# Patient Record
Sex: MALE | Race: WHITE | Employment: OTHER | URBAN - METROPOLITAN AREA
[De-identification: names, ages, dates, MRNs, and addresses within clinical notes are randomized per-mention and may not be internally consistent; named-entity substitution may affect disease eponyms.]

---

## 2017-03-02 ENCOUNTER — TELEPHONE (OUTPATIENT)
Dept: OTHER | Facility: CLINIC | Age: 32
End: 2017-03-02

## 2017-03-02 NOTE — TELEPHONE ENCOUNTER
3/2/2017    Call Regarding Onboarding [facility] CHOICES    Attempt 1    Message on voicemail     Comments: NO DEP          Outreach   Ana Maria Zepeda

## 2017-05-25 NOTE — TELEPHONE ENCOUNTER
5/25/2017    Call Regarding Onboarding [facility]    Attempt 2    Message on voicemail     Comments: NO DEP        Outreach   NIKKI

## 2017-06-06 NOTE — TELEPHONE ENCOUNTER
6/6/2017    Call Regarding Onboarding [facility]    Attempt 3    Message on voicemail     Comments: NO DEP          Outreach   NIKKI

## 2018-02-21 ENCOUNTER — MEDICAL CORRESPONDENCE (OUTPATIENT)
Dept: HEALTH INFORMATION MANAGEMENT | Facility: CLINIC | Age: 33
End: 2018-02-21

## 2018-10-09 ENCOUNTER — TELEPHONE (OUTPATIENT)
Dept: DERMATOLOGY | Facility: CLINIC | Age: 33
End: 2018-10-09

## 2018-10-09 NOTE — TELEPHONE ENCOUNTER
LVM to schedule NAL with Frank or TISHA with Nam pt referred from [facility] for exercise induced rash with no response to Benadryl.

## 2018-10-18 ENCOUNTER — PRE VISIT (OUTPATIENT)
Dept: UROLOGY | Facility: CLINIC | Age: 33
End: 2018-10-18

## 2018-10-19 ENCOUNTER — OFFICE VISIT (OUTPATIENT)
Dept: DERMATOLOGY | Facility: CLINIC | Age: 33
End: 2018-10-19
Payer: COMMERCIAL

## 2018-10-19 DIAGNOSIS — L50.8 PHYSICAL URTICARIA: Primary | ICD-10-CM

## 2018-10-19 RX ORDER — LORATADINE 10 MG/1
10 TABLET ORAL DAILY
Qty: 90 TABLET | Refills: 3 | Status: SHIPPED | OUTPATIENT
Start: 2018-10-19

## 2018-10-19 ASSESSMENT — PAIN SCALES - GENERAL: PAINLEVEL: NO PAIN (0)

## 2018-10-19 NOTE — NURSING NOTE
Dermatology Rooming Note    Ronald Davis's goals for this visit include:   Chief Complaint   Patient presents with     Derm Problem     Ronald is here regarding a rash that happens when he sweats. He would also like a skin check.      Sandra Rock LPN

## 2018-10-19 NOTE — LETTER
"10/19/2018       RE: Ronald Davis  [address] [facility] [facility] [address]     Dear Colleague,    Thank you for referring your patient, Ronald Davis, to the [facility] DERMATOLOGY at [facility]. Please see a copy of my visit note below.    [facility] Dermatology Note      Dermatology Problem List:  1. Physical urticaria, ~spring 2018, tx with PRN loratadine 10mg    Encounter Date: Oct 19, 2018    CC:  Chief Complaint   Patient presents with     Derm Problem     Ronald is here regarding a rash that happens when he sweats. He would also like a skin check.          History of Present Illness:  Mr. Ronald Davis is a 33 year old male who presents as a referral from [facility] for exercise induced rash. Patient first noticed rash last spring and notes that it has stayed the same since then in terms of distribution and appearance. Notes that after exercising he has red, itchy bumps \"that look like bug bits\" on his bilateral arms and erythematous skin on trunk. Notes these spots do go away after 30-45 minutes on their own. The worst the rash has even been was on a particularly hot day outside. Nothing like this prior to spring. No family or personal history of hayfever, facial swelling, or asthma. He tried benadryl with little relief.    Patient showed us pictures in room of raised, erythematous, coalescing, and scattered papules on dorsal surface of lower arms. Also provided picture of erythematous trunk with similar scattered papules however, fewer in number.      Past Medical History:   Patient Active Problem List   Diagnosis     CARDIOVASCULAR SCREENING; LDL GOAL LESS THAN 160     History reviewed. No pertinent past medical history.  Past Surgical History:   Procedure Laterality Date     TONSILLECTOMY         Social History:   reports that he has never smoked. He does not have any smokeless tobacco history on file. He reports that he drinks alcohol. He reports " that he does not use illicit drugs.    Family History:  Family History   Problem Relation Age of Onset     Hypertension Father      Melanoma No family hx of      Skin Cancer No family hx of        Medications:  Current Outpatient Prescriptions   Medication Sig Dispense Refill     HYDROcodone-acetaminophen (NORCO) 5-325 MG per tablet Take 1-2 tablets by mouth every 6 hours as needed for moderate to severe pain (Patient not taking: Reported on 10/19/2018) 15 tablet 0     No Known Allergies      Review of Systems:  -As per HPI  -Constitutional: The patient denies fatigue, fevers, chills, unintended weight loss, and night sweats.  -Skin: As above in HPI. No additional skin concerns.    Physical exam:  Vitals: There were no vitals taken for this visit.  GEN: This is a well developed, well-nourished male in no acute distress, in a pleasant mood.    SKIN: Total skin excluding the undergarment areas was performed. The exam included the head/face, neck, both arms, chest, back, abdomen, both legs, digits and/or nails.   Mills Type II-III  -Palpable induced dermatographism on exam  -See history above for detailed description of pictures of rash  -2mm dark brown nevi with symmetric pigmentation on right lower back with symmetric reticulated pattern on dermoscopy  -No other lesions of concern on areas examined.     Impression/Plan:  1. Physical urticaria     With transient nature of rash that seems to be correlated to exercising or temperature changes, AND with positive dermatographism on exam, this is likely physical urticaria. Benign nature was discussed with patient.     Counseled patient to take 1 tablet of loratadine half an hour before exercising for prevention of urticaria. If he finds that he has more frequent urticaria in the winter, considering the temperature changes he experiences will be more numerous, he can take 1 tablet of loratadine daily instead of just with exercise.    Follow-up prn, encouraged patient  to return if he develops any facial swelling or difficulty breathing     Staff Involved:  I, Mendy Laydarnell Dos Santos MS4, saw and examined the patient in the presence of Dr. Marie.    Staff Physician Comments:  I was present with the medical student who participated in the service and in the documentation of the note. I have verified the history and personally performed the physical exam and medical decision making. I agree with the assessment and plan as documented in the note. I have reviewed and if necessary amended the note.      Aleksander Marie MD  Professor  Head of Dermato-Allergy Division  Department of Dermatology  [facility]       I spent a total of 20 min face to face with Ronald Davis during today's office visit. About 50% of the time was spent counseling the patient and/or coordinating care regarding their allergy.      Again, thank you for allowing me to participate in the care of your patient.      Sincerely,    Aleksander Marie MD

## 2018-10-19 NOTE — PATIENT INSTRUCTIONS
Take 1 tablet of loratadine (Claritin) half an hour before exercising. If you find that these hives occur more regularly, especially with the temperature changes in winter, you may take 1 tablet daily to help prevent hives.

## 2018-10-19 NOTE — PROGRESS NOTES
"[facility] Dermatology Note      Dermatology Problem List:  1. Physical urticaria, ~spring 2018, tx with PRN loratadine 10mg    Encounter Date: Oct 19, 2018    CC:  Chief Complaint   Patient presents with     Derm Problem     Ronald is here regarding a rash that happens when he sweats. He would also like a skin check.          History of Present Illness:  Mr. Ronald Davis is a 33 year old male who presents as a referral from [facility] for exercise induced rash. Patient first noticed rash last spring and notes that it has stayed the same since then in terms of distribution and appearance. Notes that after exercising he has red, itchy bumps \"that look like bug bits\" on his bilateral arms and erythematous skin on trunk. Notes these spots do go away after 30-45 minutes on their own. The worst the rash has even been was on a particularly hot day outside. Nothing like this prior to spring. No family or personal history of hayfever, facial swelling, or asthma. He tried benadryl with little relief.    Patient showed us pictures in room of raised, erythematous, coalescing, and scattered papules on dorsal surface of lower arms. Also provided picture of erythematous trunk with similar scattered papules however, fewer in number.      Past Medical History:   Patient Active Problem List   Diagnosis     CARDIOVASCULAR SCREENING; LDL GOAL LESS THAN 160     History reviewed. No pertinent past medical history.  Past Surgical History:   Procedure Laterality Date     TONSILLECTOMY         Social History:   reports that he has never smoked. He does not have any smokeless tobacco history on file. He reports that he drinks alcohol. He reports that he does not use illicit drugs.    Family History:  Family History   Problem Relation Age of Onset     Hypertension Father      Melanoma No family hx of      Skin Cancer No family hx of        Medications:  Current Outpatient Prescriptions   Medication Sig Dispense Refill     " HYDROcodone-acetaminophen (NORCO) 5-325 MG per tablet Take 1-2 tablets by mouth every 6 hours as needed for moderate to severe pain (Patient not taking: Reported on 10/19/2018) 15 tablet 0     No Known Allergies      Review of Systems:  -As per HPI  -Constitutional: The patient denies fatigue, fevers, chills, unintended weight loss, and night sweats.  -Skin: As above in HPI. No additional skin concerns.    Physical exam:  Vitals: There were no vitals taken for this visit.  GEN: This is a well developed, well-nourished male in no acute distress, in a pleasant mood.    SKIN: Total skin excluding the undergarment areas was performed. The exam included the head/face, neck, both arms, chest, back, abdomen, both legs, digits and/or nails.   Mills Type II-III  -Palpable induced dermatographism on exam  -See history above for detailed description of pictures of rash  -2mm dark brown nevi with symmetric pigmentation on right lower back with symmetric reticulated pattern on dermoscopy  -No other lesions of concern on areas examined.     Impression/Plan:  1. Physical urticaria     With transient nature of rash that seems to be correlated to exercising or temperature changes, AND with positive dermatographism on exam, this is likely physical urticaria. Benign nature was discussed with patient.     Counseled patient to take 1 tablet of loratadine half an hour before exercising for prevention of urticaria. If he finds that he has more frequent urticaria in the winter, considering the temperature changes he experiences will be more numerous, he can take 1 tablet of loratadine daily instead of just with exercise.    Follow-up prn, encouraged patient to return if he develops any facial swelling or difficulty breathing     Staff Involved:  I, Mendy Dos Santos MS4, saw and examined the patient in the presence of Dr. Marie.    Staff Physician Comments:  I was present with the medical student who participated in the service  and in the documentation of the note. I have verified the history and personally performed the physical exam and medical decision making. I agree with the assessment and plan as documented in the note. I have reviewed and if necessary amended the note.      Aleksander Marie MD  Professor  Head of Dermato-Allergy Division  Department of Dermatology  [facility]       I spent a total of 20 min face to face with Ronald Davis during today's office visit. About 50% of the time was spent counseling the patient and/or coordinating care regarding their allergy.

## 2018-10-19 NOTE — MR AVS SNAPSHOT
After Visit Summary   10/19/2018    Ronald Davis    MRN: [id number]           Patient Information     Date Of Birth          1985        Visit Information        Provider Department      10/19/2018 8:45 AM Aleksander Marie MD [facility] Dermatology        Today's Diagnoses     Physical urticaria    -  1      Care Instructions    Take 1 tablet of loratadine (Claritin) half an hour before exercising. If you find that these hives occur more regularly, especially with the temperature changes in winter, you may take 1 tablet daily to help prevent hives.          Follow-ups after your visit        Your next 10 appointments already scheduled     2018  9:30 AM CST   (Arrive by 9:15 AM)   New Patient Visit with Annalisa Conley MD   [facility] Urology and [facility] ([facility] [place])    [address] [place] [id number] [phone]              Who to contact     Please call your clinic at [phone] to:    Ask questions about your health    Make or cancel appointments    Discuss your medicines    Learn about your test results    Speak to your doctor            Additional Information About Your Visit        MyChart Information     [employer] is an electronic gateway that provides easy, online access to your medical records. With [employer], you can request a clinic appointment, read your test results, renew a prescription or communicate with your care team.     To sign up for [employer] visit the website at [email or web address]   You will be asked to enter the access code listed below, as well as some personal information. Please follow the directions to create your username and password.     Your access code is: [email or web address]  Expires: 2019  6:30 AM     Your access code will  in 90 days. If you need help or a new code, please contact your [facility] or call [phone] for  assistance.        Care EveryWhere ID     This is your Care EveryWhere ID. This could be used by other organizations to access your [facility] medical records  [id number]         Blood Pressure from Last 3 Encounters:   04/07/14 (!) 134/93   03/24/10 120/86    Weight from Last 3 Encounters:   03/24/10 64.9 kg (143 lb)              Today, you had the following     No orders found for display       Primary Care Provider Office Phone # Fax #    Sarbjit Alas, NORMAN [phone]       [facility] [address] [facility] [address]        Equal Access to Services     [facility]: Hadii aad ku hadasho Soomaali, waaxda luqadaha, qaybta kaalmada adeegyada, padilla yan . So [facility] [phone].    ATENCIÓN: Si habla español, tiene a enriquez disposición servicios gratuitos de asistencia lingüística. Llame al [phone].    We comply with applicable federal civil rights laws and [place] laws. We do not discriminate on the basis of race, color, national origin, age, disability, sex, sexual orientation, or gender identity.            Thank you!     Thank you for choosing [facility] DERMATOLOGY  for your care. Our goal is always to provide you with excellent care. Hearing back from our patients is one way we can continue to improve our services. Please take a few minutes to complete the written survey that you may receive in the mail after your visit with us. Thank you!             Your Updated Medication List - Protect others around you: Learn how to safely use, store and throw away your medicines at www.disposemymeds.org.          This list is accurate as of 10/19/18  9:33 AM.  Always use your most recent med list.                   Brand Name Dispense Instructions for use Diagnosis    HYDROcodone-acetaminophen 5-325 MG per tablet    NORCO    15 tablet    Take 1-2 tablets by mouth every 6 hours as needed for moderate to severe pain

## 2018-10-29 ENCOUNTER — PRE VISIT (OUTPATIENT)
Dept: UROLOGY | Facility: CLINIC | Age: 33
End: 2018-10-29